# Patient Record
Sex: FEMALE | Race: WHITE | HISPANIC OR LATINO | Employment: UNEMPLOYED | URBAN - METROPOLITAN AREA
[De-identification: names, ages, dates, MRNs, and addresses within clinical notes are randomized per-mention and may not be internally consistent; named-entity substitution may affect disease eponyms.]

---

## 2017-03-07 ENCOUNTER — LAB VISIT (OUTPATIENT)
Dept: LAB | Facility: HOSPITAL | Age: 32
End: 2017-03-07
Attending: OBSTETRICS & GYNECOLOGY
Payer: MEDICAID

## 2017-03-07 DIAGNOSIS — Z34.83 PRENATAL CARE, SUBSEQUENT PREGNANCY, THIRD TRIMESTER: ICD-10-CM

## 2017-03-07 LAB
ABO + RH BLD: NORMAL
BLD GP AB SCN CELLS X3 SERPL QL: NORMAL
RH BLD: NORMAL

## 2017-03-07 PROCEDURE — 86901 BLOOD TYPING SEROLOGIC RH(D): CPT | Mod: 91

## 2017-03-07 PROCEDURE — 36415 COLL VENOUS BLD VENIPUNCTURE: CPT

## 2017-03-07 PROCEDURE — 86900 BLOOD TYPING SEROLOGIC ABO: CPT

## 2017-03-07 PROCEDURE — 86901 BLOOD TYPING SEROLOGIC RH(D): CPT

## 2017-03-08 ENCOUNTER — HOSPITAL ENCOUNTER (OUTPATIENT)
Dept: OBSTETRICS AND GYNECOLOGY | Facility: HOSPITAL | Age: 32
Discharge: HOME | End: 2017-03-08
Attending: OBSTETRICS & GYNECOLOGY
Payer: MEDICAID

## 2017-03-08 DIAGNOSIS — Z34.83 PRENATAL CARE, SUBSEQUENT PREGNANCY, THIRD TRIMESTER: ICD-10-CM

## 2017-03-08 LAB — INJECT RH IG VOL PATIENT: NORMAL ML

## 2017-03-08 PROCEDURE — 63600519 RHOGAM PHARM REV CODE 636 ALT 250 W HCPCS: Performed by: OBSTETRICS & GYNECOLOGY

## 2017-03-08 PROCEDURE — 96372 THER/PROPH/DIAG INJ SC/IM: CPT

## 2017-03-08 RX ADMIN — HUMAN RHO(D) IMMUNE GLOBULIN 300 MCG: 300 INJECTION, SOLUTION INTRAMUSCULAR at 05:03

## 2017-05-30 PROBLEM — Z34.80 PRENATAL CARE, SUBSEQUENT PREGNANCY: Status: ACTIVE | Noted: 2017-05-30

## 2017-06-08 ENCOUNTER — TELEPHONE (OUTPATIENT)
Dept: OBSTETRICS AND GYNECOLOGY | Facility: HOSPITAL | Age: 32
End: 2017-06-08

## 2017-06-08 NOTE — TELEPHONE ENCOUNTER
Spoke to pt who states baby continues breastfeeding well and having many wet and dirty diapers -about 8 a day -reassured normal -denies any problems with breasts and has no questions or concerns at this time

## 2024-09-04 ENCOUNTER — HOSPITAL ENCOUNTER (EMERGENCY)
Facility: HOSPITAL | Age: 39
Discharge: HOME | End: 2024-09-04
Attending: FAMILY MEDICINE
Payer: COMMERCIAL

## 2024-09-04 VITALS
SYSTOLIC BLOOD PRESSURE: 127 MMHG | DIASTOLIC BLOOD PRESSURE: 89 MMHG | TEMPERATURE: 98 F | OXYGEN SATURATION: 97 % | RESPIRATION RATE: 20 BRPM | BODY MASS INDEX: 45.62 KG/M2 | WEIGHT: 267.19 LBS | HEART RATE: 67 BPM | HEIGHT: 64 IN

## 2024-09-04 DIAGNOSIS — H66.90 OTITIS MEDIA, UNSPECIFIED LATERALITY, UNSPECIFIED OTITIS MEDIA TYPE: Primary | ICD-10-CM

## 2024-09-04 PROCEDURE — 99284 EMERGENCY DEPT VISIT MOD MDM: CPT

## 2024-09-04 RX ORDER — IBUPROFEN 800 MG/1
800 TABLET ORAL 3 TIMES DAILY PRN
Qty: 30 TABLET | Refills: 0 | Status: SHIPPED | OUTPATIENT
Start: 2024-09-04

## 2024-09-04 RX ORDER — AMOXICILLIN AND CLAVULANATE POTASSIUM 875; 125 MG/1; MG/1
1 TABLET, FILM COATED ORAL EVERY 12 HOURS
Qty: 14 TABLET | Refills: 0 | Status: SHIPPED | OUTPATIENT
Start: 2024-09-04 | End: 2024-09-11

## 2024-09-04 RX ORDER — NEOMYCIN SULFATE, POLYMYXIN B SULFATE AND HYDROCORTISONE 10; 3.5; 1 MG/ML; MG/ML; [USP'U]/ML
3 SUSPENSION/ DROPS AURICULAR (OTIC) 3 TIMES DAILY
Qty: 10 ML | Refills: 0 | Status: SHIPPED | OUTPATIENT
Start: 2024-09-04

## 2024-09-04 NOTE — ED PROVIDER NOTES
Encounter Date: 9/4/2024       History     Chief Complaint   Patient presents with    Otalgia     Pt reports ear pain x6 months, reports being put on antibiotics with no relief.       Patient states she was seen about a month ago and diagnosed with an ear infection.  Took the course of antibiotics and 2 days ago started having left ear pain again.    The history is provided by the patient.   Otalgia  This is a recurrent problem. The current episode started two days ago. There is pain in the left ear.     Review of patient's allergies indicates:  No Known Allergies  Past Medical History:   Diagnosis Date    Anxiety      Past Surgical History:   Procedure Laterality Date    HERNIA REPAIR      UMBILICAL HERNIA REPAIR       Family History   Problem Relation Name Age of Onset    Hypertension Mother      Diabetes Sister      Cancer Neg Hx      Breast cancer Neg Hx      Colon cancer Neg Hx      Ovarian cancer Neg Hx       Social History     Tobacco Use    Smoking status: Never   Substance Use Topics    Alcohol use: No    Drug use: No     Review of Systems   HENT:  Positive for ear pain.    All other systems reviewed and are negative.      Physical Exam     Initial Vitals [09/04/24 0934]   BP Pulse Resp Temp SpO2   (!) 139/93 72 16 98 °F (36.7 °C) 97 %      MAP       --         Physical Exam    Nursing note and vitals reviewed.  Constitutional: She appears well-developed and well-nourished. She is not diaphoretic. No distress.   HENT:   Head: Normocephalic and atraumatic.   Left Ear: There is swelling and tenderness. Tympanic membrane is injected.   Nose: Nose normal.   Mouth/Throat: Oropharynx is clear and moist.   Eyes: Conjunctivae and EOM are normal. Pupils are equal, round, and reactive to light.   Neck: Neck supple. No tracheal deviation present.   Normal range of motion.  Cardiovascular:  Normal rate, intact distal pulses and normal pulses.     Exam reveals no decreased pulses.       Pulmonary/Chest: Effort normal.  No respiratory distress.   Abdominal: Abdomen is soft. She exhibits no distension. There is no abdominal tenderness.   Musculoskeletal:         General: No tenderness. Normal range of motion.      Cervical back: Normal range of motion and neck supple.      Comments: No acute change     Neurological: She is alert and oriented to person, place, and time. GCS score is 15. GCS eye subscore is 4. GCS verbal subscore is 5. GCS motor subscore is 6.   No acute change   Skin: Skin is warm and dry. No rash noted.   Psychiatric: She has a normal mood and affect. Thought content normal.         ED Course   Procedures  Labs Reviewed - No data to display       Imaging Results    None          Medications - No data to display  Medical Decision Making    Additional MDM:   Differential Diagnosis:   Otitis externa, otitis media, otalgia, impaction, barotrauma                                    Clinical Impression:  Final diagnoses:  [H66.90] Otitis media, unspecified laterality, unspecified otitis media type (Primary)          ED Disposition Condition    Discharge Stable          ED Prescriptions       Medication Sig Dispense Start Date End Date Auth. Provider    amoxicillin-clavulanate 875-125mg (AUGMENTIN) 875-125 mg per tablet Take 1 tablet by mouth every 12 (twelve) hours. for 7 days 14 tablet 9/4/2024 9/11/2024 Henok Salcedo MD    ibuprofen (ADVIL,MOTRIN) 800 MG tablet Take 1 tablet (800 mg total) by mouth 3 (three) times daily as needed for Pain. 30 tablet 9/4/2024 -- Henok Salcedo MD    neomycin-polymyxin-hydrocortisone (CORTISPORIN) 3.5-10,000-1 mg/mL-unit/mL-% otic suspension Place 3 drops into the left ear 3 (three) times daily. 10 mL 9/4/2024 -- Henok Salcedo MD          Follow-up Information       Follow up With Specialties Details Why Contact Info    [facility], Faby GUERRERO [facility]    [address] [facility] [address] [phone]      [facility] - Otolaryngology Otolaryngology Schedule  an appointment as soon as possible for a visit   [address]  [facility] [address]             Henok Salcedo MD  09/04/24 0959

## 2024-09-13 ENCOUNTER — HOSPITAL ENCOUNTER (EMERGENCY)
Facility: HOSPITAL | Age: 39
Discharge: HOME | End: 2024-09-14
Payer: COMMERCIAL

## 2024-09-13 DIAGNOSIS — N30.00 ACUTE CYSTITIS WITHOUT HEMATURIA: Primary | ICD-10-CM

## 2024-09-13 DIAGNOSIS — D27.1 TERATOMA OF OVARY, LEFT: ICD-10-CM

## 2024-09-13 DIAGNOSIS — D27.0 TERATOMA OF RIGHT OVARY: ICD-10-CM

## 2024-09-13 DIAGNOSIS — R10.2 PELVIC PAIN: ICD-10-CM

## 2024-09-13 LAB
B-HCG UR QL: NEGATIVE
BACTERIA #/AREA URNS AUTO: ABNORMAL /HPF
BILIRUB UR QL STRIP.AUTO: NEGATIVE
CLARITY UR: ABNORMAL
COLOR UR AUTO: ABNORMAL
GLUCOSE UR QL STRIP: NEGATIVE
HGB UR QL STRIP: NEGATIVE
KETONES UR QL STRIP: ABNORMAL
LEUKOCYTE ESTERASE UR QL STRIP: NEGATIVE
MUCOUS THREADS URNS QL MICRO: ABNORMAL /LPF
NITRITE UR QL STRIP: POSITIVE
PH UR STRIP: 6 [PH]
PROT UR QL STRIP: NEGATIVE
RBC #/AREA URNS AUTO: ABNORMAL /HPF
SP GR UR STRIP.AUTO: >=1.03 (ref 1–1.03)
SQUAMOUS #/AREA URNS AUTO: ABNORMAL /HPF
UROBILINOGEN UR STRIP-ACNC: 0.2
WBC #/AREA URNS AUTO: ABNORMAL /HPF

## 2024-09-13 PROCEDURE — 81003 URINALYSIS AUTO W/O SCOPE: CPT

## 2024-09-13 PROCEDURE — 87077 CULTURE AEROBIC IDENTIFY: CPT

## 2024-09-13 PROCEDURE — 81015 MICROSCOPIC EXAM OF URINE: CPT

## 2024-09-13 PROCEDURE — 99285 EMERGENCY DEPT VISIT HI MDM: CPT | Mod: 25

## 2024-09-13 PROCEDURE — 81025 URINE PREGNANCY TEST: CPT

## 2024-09-13 PROCEDURE — 87086 URINE CULTURE/COLONY COUNT: CPT

## 2024-09-13 RX ORDER — KETOROLAC TROMETHAMINE 30 MG/ML
30 INJECTION, SOLUTION INTRAMUSCULAR; INTRAVENOUS
Status: COMPLETED | OUTPATIENT
Start: 2024-09-14 | End: 2024-09-14

## 2024-09-13 NOTE — Clinical Note
"Niki Mcbride" Nathan Thakur was seen and treated in our emergency department on 9/13/2024.  She may return to work on 09/23/2024.       If you have any questions or concerns, please don't hesitate to call.      Jose R Brown MD"

## 2024-09-14 VITALS
WEIGHT: 268 LBS | SYSTOLIC BLOOD PRESSURE: 164 MMHG | BODY MASS INDEX: 46 KG/M2 | HEART RATE: 79 BPM | TEMPERATURE: 98 F | DIASTOLIC BLOOD PRESSURE: 104 MMHG | RESPIRATION RATE: 18 BRPM | OXYGEN SATURATION: 97 %

## 2024-09-14 PROBLEM — R10.2 PELVIC PAIN: Status: ACTIVE | Noted: 2024-09-14

## 2024-09-14 PROBLEM — N30.00 ACUTE CYSTITIS WITHOUT HEMATURIA: Status: ACTIVE | Noted: 2024-09-14

## 2024-09-14 PROCEDURE — 63600175 PHARM REV CODE 636 W HCPCS

## 2024-09-14 PROCEDURE — 25000003 PHARM REV CODE 250

## 2024-09-14 PROCEDURE — 96374 THER/PROPH/DIAG INJ IV PUSH: CPT

## 2024-09-14 PROCEDURE — 96375 TX/PRO/DX INJ NEW DRUG ADDON: CPT

## 2024-09-14 RX ORDER — NITROFURANTOIN 25; 75 MG/1; MG/1
100 CAPSULE ORAL 2 TIMES DAILY
Qty: 10 CAPSULE | Refills: 0 | Status: SHIPPED | OUTPATIENT
Start: 2024-09-14 | End: 2024-09-19

## 2024-09-14 RX ORDER — NAPROXEN 500 MG/1
500 TABLET ORAL 2 TIMES DAILY
Qty: 20 TABLET | Refills: 0 | Status: SHIPPED | OUTPATIENT
Start: 2024-09-14

## 2024-09-14 RX ADMIN — KETOROLAC TROMETHAMINE 30 MG: 30 INJECTION, SOLUTION INTRAMUSCULAR at 12:09

## 2024-09-14 RX ADMIN — CEFTRIAXONE SODIUM 1 G: 1 INJECTION, POWDER, FOR SOLUTION INTRAMUSCULAR; INTRAVENOUS at 12:09

## 2024-09-14 NOTE — ED PROVIDER NOTES
Encounter Date: 9/13/2024       History     Chief Complaint   Patient presents with    Abdominal Pain     LOWER ABDOMINAL PAIN AND DYSURIA X2 DAYS     39-year-old female presents complaining of pelvic pain and dysuria for 2 days.  She denies any fever or chills.  She does not think she is pregnant.  Denies any constipation or diarrhea.    The history is provided by the patient.     Review of patient's allergies indicates:  No Known Allergies  Past Medical History:   Diagnosis Date    Anxiety      Past Surgical History:   Procedure Laterality Date    HERNIA REPAIR      UMBILICAL HERNIA REPAIR       Family History   Problem Relation Name Age of Onset    Hypertension Mother      Diabetes Sister      Cancer Neg Hx      Breast cancer Neg Hx      Colon cancer Neg Hx      Ovarian cancer Neg Hx       Social History     Tobacco Use    Smoking status: Never   Substance Use Topics    Alcohol use: No    Drug use: No     Review of Systems   Constitutional:  Negative for fever.   HENT:  Negative for sore throat.    Respiratory:  Negative for shortness of breath.    Cardiovascular:  Negative for chest pain.   Gastrointestinal:  Negative for nausea.   Genitourinary:  Positive for dysuria and pelvic pain.   Musculoskeletal:  Negative for back pain.   Skin:  Negative for rash.   Neurological:  Negative for weakness.   Hematological:  Does not bruise/bleed easily.   All other systems reviewed and are negative.      Physical Exam     Initial Vitals [09/13/24 2316]   BP Pulse Resp Temp SpO2   (!) 164/104 85 18 98 °F (36.7 °C) 98 %      MAP       --         Physical Exam    Nursing note and vitals reviewed.  Constitutional: Vital signs are normal. She appears well-developed and well-nourished. She is cooperative.   HENT:   Head: Normocephalic and atraumatic.   Mouth/Throat: Oropharynx is clear and moist.   Eyes: Conjunctivae, EOM and lids are normal. Pupils are equal, round, and reactive to light.   Neck: Trachea normal. Neck supple.    Normal range of motion.  Cardiovascular:  Normal rate, regular rhythm, normal heart sounds and intact distal pulses.           Pulmonary/Chest: Breath sounds normal.   Abdominal: Abdomen is soft. Bowel sounds are normal. She exhibits no distension. There is no abdominal tenderness. There is no rebound and no guarding.   Musculoskeletal:         General: Normal range of motion.      Cervical back: Normal, normal range of motion and neck supple.      Lumbar back: Normal.     Neurological: She is alert and oriented to person, place, and time. She has normal strength. Coordination normal. GCS score is 15. GCS eye subscore is 4. GCS verbal subscore is 5. GCS motor subscore is 6.   Skin: Skin is warm, dry and intact. Capillary refill takes less than 2 seconds.   Psychiatric: She has a normal mood and affect. Her speech is normal and behavior is normal. Judgment and thought content normal. Cognition and memory are normal.         ED Course   Procedures  Labs Reviewed   URINALYSIS, REFLEX TO URINE CULTURE - Abnormal       Result Value    Color, UA Orange (*)     Appearance, UA Turbid (*)     Specific Gravity, UA >=1.030      pH, UA 6.0      Protein, UA Negative      Glucose, UA Negative      Ketones, UA Trace (*)     Blood, UA Negative      Bilirubin, UA Negative      Urobilinogen, UA 0.2      Nitrites, UA Positive (*)     Leukocyte Esterase, UA Negative      Narrative:      URINE STABILITY IS 2 HOURS AT ROOM TEMP OR    SIX HOURS REFRIGERATED. PERFORMING TESTING ON    SPECIMENS GREATER THAN THIS AGE MAY AFFECT THE    FOLLOWING TESTS:    PH          SPECIFIC GRAVITY           BLOOD    CLARITY     BILIRUBIN               UROBILINOGEN   URINALYSIS, MICROSCOPIC - Abnormal    Bacteria, UA Moderate (*)     Mucous, UA Small (*)     RBC, UA 3-5      WBC, UA 6-10 (*)     Squamous Epithelial Cells, UA Many (*)    HCG QUALITATIVE URINE - Normal    hCG Qualitative, Urine Negative     CULTURE, URINE          Imaging Results               CT Abdomen Pelvis  Without Contrast (Preliminary result)  Result time 09/14/24 00:34:34      Preliminary result by Kem Epps MD (09/14/24 00:34:34)                   Narrative:    START OF REPORT:  Technique: CT of the abdomen and pelvis was performed with axial images as well as sagittal and coronal reconstruction images without intravenous contrast.    Comparison: None available.    Clinical History: LOW ABD PN.    Dosage Information: Automated Exposure Control was utilized.    Findings:  Lines and Tubes: None.  Thorax:  Lungs: The visualized lung bases appear unremarkable. No focal infiltrate or consolidation is seen.  Pleura: No effusions or thickening. No pneumothorax is seen.  Heart: The heart size is within normal limits.  Abdomen:  Abdominal Wall: No abdominal wall pathology is seen.  Liver: Moderate fatty infiltration of the liver is present. The liver otherwise appears unremarkable.  Biliary System: No intrahepatic or extrahepatic biliary duct dilatation is seen.  Gallbladder: The gallbladder appears unremarkable.  Pancreas: The pancreas appears unremarkable.  Spleen: The spleen appears unremarkable.  Adrenals: The adrenal glands appear unremarkable.  Kidneys: The left kidney appears unremarkable with no stones cysts masses or hydronephrosis. There is a 3 cm cyst in the interpolar region of the right kidney. The right kidney otherwise appears unremarkable with no stones masses or hydronephrosis.  Aorta: The abdominal aorta appears unremarkable.  IVC: Unremarkable.  Bowel:  Esophagus: The visualized esophagus appears unremarkable.  Stomach: The stomach appears unremarkable.  Duodenum: Unremarkable appearing duodenum.  Small Bowel: The small bowel appears unremarkable.  Colon: A few diverticula are seen in the sigmoid colon.  Appendix: The appendix appears unremarkable and is seen on series 3 image 59 to 63.  Peritoneum: No intraperitoneal free air or ascites is seen.    Pelvis:  Bladder: The  bladder is nondistended and cannot be definitively evaluated.  Female:  Uterus: The uterus appears unremarkable.  Ovaries: There is a large lobulated mixed attenuating mass with fat, soft tissue and punctate calcified components in the right adnexa measuring 8.5 x 10.4 x 4.6 cm (series 3 image 68). There is a similar smaller lesion in the left adnexa measuring 3.4 x 3.6 x 2.6 cm (series 3 image 71). These are consistent with mature ovarian teratomas.    Bony structures:  Dorsal Spine: There is subtle multilevel spondylosis of the visualized dorsal spine.  Bony Pelvis: The visualized bony structures of the pelvis appear unremarkable.      Impression:  1. There is a large lobulated mixed attenuating mass with fat, soft tissue and punctate calcified components in the right adnexa measuring 8.5 x 10.4 x 4.6 cm (series 3 image 68). There is a similar smaller lesion in the left adnexa measuring 3.4 x 3.6 x 2.6 cm (series 3 image 71). These are consistent with mature ovarian teratomas. Correlate with clinical findings as regards additional evaluation and follow-up.  2. No acute intraabdominal or pelvic solid organ or bowel pathology identified. Details and other findings as discussed above.                                         Medications   sodium chloride 0.9% bolus 1,000 mL 1,000 mL (has no administration in time range)   cefTRIAXone (Rocephin) 1 g in D5W 100 mL IVPB (MB+) (1 g Intravenous Incomplete 9/14/24 0035)   ketorolac injection 30 mg (30 mg Intravenous Incomplete 9/14/24 0033)     Medical Decision Making  Dysuria, pelvic pain x2 days  Differential diagnosis:  Appendicitis, diverticulitis, UTI, ureterolithiasis, ovarian cyst, constipation, enteritis, AAA  UA, UPT, CT    Amount and/or Complexity of Data Reviewed  Labs:  Decision-making details documented in ED Course.  Radiology: ordered.    Risk  Prescription drug management.               ED Course as of 09/14/24 0041   Fri Sep 13, 2024   2347 Urinalysis,  Microscopic(!)  Consistent with a UTI [TM]   Sat Sep 14, 2024   0037 CT Abdomen Pelvis  Without Contrast  Bilateral teratomas noted.  These were seen on previous testing. [TM]      ED Course User Index  [TM] Jose R Brown MD                             Clinical Impression:  Final diagnoses:  [N30.00] Acute cystitis without hematuria (Primary)  [R10.2] Pelvic pain  [D27.1] Teratoma of ovary, left  [D27.0] Teratoma of right ovary          ED Disposition Condition    Discharge Good          ED Prescriptions       Medication Sig Dispense Start Date End Date Auth. Provider    nitrofurantoin, macrocrystal-monohydrate, (MACROBID) 100 MG capsule Take 1 capsule (100 mg total) by mouth 2 (two) times daily. for 5 days 10 capsule 9/14/2024 9/19/2024 Jose R Brown MD    naproxen (NAPROSYN) 500 MG tablet Take 1 tablet (500 mg total) by mouth 2 (two) times daily. 20 tablet 9/14/2024 -- Jose R Brown MD          Follow-up Information       Follow up With Specialties Details Why Contact Info    [place], [facility]  Call in 2 days  [address] [facility] [address] [phone]               Jose R Brown MD  09/14/24 0041

## 2024-09-16 LAB — BACTERIA UR CULT: ABNORMAL

## 2024-12-16 ENCOUNTER — HOSPITAL ENCOUNTER (EMERGENCY)
Facility: HOSPITAL | Age: 39
Discharge: HOME | End: 2024-12-16
Attending: SURGERY
Payer: COMMERCIAL

## 2024-12-16 VITALS
SYSTOLIC BLOOD PRESSURE: 142 MMHG | HEART RATE: 67 BPM | BODY MASS INDEX: 41.99 KG/M2 | DIASTOLIC BLOOD PRESSURE: 99 MMHG | HEIGHT: 65 IN | OXYGEN SATURATION: 97 % | RESPIRATION RATE: 20 BRPM | WEIGHT: 252 LBS | TEMPERATURE: 98 F

## 2024-12-16 DIAGNOSIS — I10 UNCONTROLLED HYPERTENSION: ICD-10-CM

## 2024-12-16 DIAGNOSIS — E66.9 OBESITY, UNSPECIFIED CLASS, UNSPECIFIED OBESITY TYPE, UNSPECIFIED WHETHER SERIOUS COMORBIDITY PRESENT: ICD-10-CM

## 2024-12-16 DIAGNOSIS — O20.0 THREATENED ABORTION: Primary | ICD-10-CM

## 2024-12-16 DIAGNOSIS — D50.9 HYPOCHROMIC MICROCYTIC ANEMIA: ICD-10-CM

## 2024-12-16 LAB
ALBUMIN SERPL-MCNC: 4 G/DL (ref 3.4–5)
ALBUMIN/GLOB SERPL: 1.5 RATIO
ALP SERPL-CCNC: 79 UNIT/L (ref 50–144)
ALT SERPL-CCNC: 31 UNIT/L (ref 1–45)
ANION GAP SERPL CALC-SCNC: 8 MEQ/L (ref 2–13)
AST SERPL-CCNC: 29 UNIT/L (ref 14–36)
B-HCG FREE SERPL-ACNC: 153.26 MIU/ML
BACTERIA #/AREA URNS AUTO: ABNORMAL /HPF
BASOPHILS # BLD AUTO: 0.04 X10(3)/MCL (ref 0.01–0.08)
BASOPHILS NFR BLD AUTO: 0.4 % (ref 0.1–1.2)
BILIRUB SERPL-MCNC: 0.4 MG/DL (ref 0–1)
BILIRUB UR QL STRIP.AUTO: NEGATIVE
BUN SERPL-MCNC: 13 MG/DL (ref 7–20)
CALCIUM SERPL-MCNC: 9 MG/DL (ref 8.4–10.2)
CHLORIDE SERPL-SCNC: 108 MMOL/L (ref 98–110)
CLARITY UR: CLEAR
CO2 SERPL-SCNC: 22 MMOL/L (ref 21–32)
COLOR UR AUTO: YELLOW
CREAT SERPL-MCNC: 0.79 MG/DL (ref 0.66–1.25)
CREAT/UREA NIT SERPL: 16 (ref 12–20)
EOSINOPHIL # BLD AUTO: 0.13 X10(3)/MCL (ref 0.04–0.36)
EOSINOPHIL NFR BLD AUTO: 1.2 % (ref 0.7–7)
ERYTHROCYTE [DISTWIDTH] IN BLOOD BY AUTOMATED COUNT: 15.4 % (ref 11–14.5)
GFR SERPLBLD CREATININE-BSD FMLA CKD-EPI: >90 ML/MIN/1.73/M2
GLOBULIN SER-MCNC: 2.7 GM/DL (ref 2–3.9)
GLUCOSE SERPL-MCNC: 110 MG/DL (ref 70–115)
GLUCOSE UR QL STRIP: NEGATIVE
HCT VFR BLD AUTO: 34.4 % (ref 36–48)
HGB BLD-MCNC: 11.4 G/DL (ref 11.8–16)
HGB UR QL STRIP: ABNORMAL
IMM GRANULOCYTES # BLD AUTO: 0.05 X10(3)/MCL (ref 0–0.03)
IMM GRANULOCYTES NFR BLD AUTO: 0.5 % (ref 0–0.5)
KETONES UR QL STRIP: NEGATIVE
LEUKOCYTE ESTERASE UR QL STRIP: ABNORMAL
LYMPHOCYTES # BLD AUTO: 2.78 X10(3)/MCL (ref 1.16–3.74)
LYMPHOCYTES NFR BLD AUTO: 26.7 % (ref 20–55)
MCH RBC QN AUTO: 26.1 PG (ref 27–34)
MCHC RBC AUTO-ENTMCNC: 33.1 G/DL (ref 31–37)
MCV RBC AUTO: 78.9 FL (ref 79–99)
MONOCYTES # BLD AUTO: 0.61 X10(3)/MCL (ref 0.24–0.36)
MONOCYTES NFR BLD AUTO: 5.8 % (ref 4.7–12.5)
NEUTROPHILS # BLD AUTO: 6.82 X10(3)/MCL (ref 1.56–6.13)
NEUTROPHILS NFR BLD AUTO: 65.4 % (ref 37–73)
NITRITE UR QL STRIP: NEGATIVE
NRBC BLD AUTO-RTO: 0 %
PH UR STRIP: 6.5 [PH]
PLATELET # BLD AUTO: 284 X10(3)/MCL (ref 140–371)
PMV BLD AUTO: 10.6 FL (ref 9.4–12.4)
POTASSIUM SERPL-SCNC: 3.7 MMOL/L (ref 3.5–5.1)
PROT SERPL-MCNC: 6.7 GM/DL (ref 6.3–8.2)
PROT UR QL STRIP: NEGATIVE
RBC # BLD AUTO: 4.36 X10(6)/MCL (ref 4–5.1)
RBC #/AREA URNS AUTO: ABNORMAL /HPF
SODIUM SERPL-SCNC: 138 MMOL/L (ref 136–145)
SP GR UR STRIP.AUTO: 1.01 (ref 1–1.03)
SQUAMOUS #/AREA URNS AUTO: ABNORMAL /HPF
UROBILINOGEN UR STRIP-ACNC: 0.2
WBC # BLD AUTO: 10.43 X10(3)/MCL (ref 4–11.5)
WBC #/AREA URNS AUTO: ABNORMAL /HPF

## 2024-12-16 PROCEDURE — 25000003 PHARM REV CODE 250: Performed by: SURGERY

## 2024-12-16 PROCEDURE — 85025 COMPLETE CBC W/AUTO DIFF WBC: CPT | Performed by: SURGERY

## 2024-12-16 PROCEDURE — 96374 THER/PROPH/DIAG INJ IV PUSH: CPT

## 2024-12-16 PROCEDURE — 96376 TX/PRO/DX INJ SAME DRUG ADON: CPT

## 2024-12-16 PROCEDURE — 81015 MICROSCOPIC EXAM OF URINE: CPT | Performed by: SURGERY

## 2024-12-16 PROCEDURE — 81003 URINALYSIS AUTO W/O SCOPE: CPT | Performed by: SURGERY

## 2024-12-16 PROCEDURE — 63600175 PHARM REV CODE 636 W HCPCS: Mod: JZ,JG | Performed by: SURGERY

## 2024-12-16 PROCEDURE — 99285 EMERGENCY DEPT VISIT HI MDM: CPT | Mod: 25

## 2024-12-16 PROCEDURE — 84702 CHORIONIC GONADOTROPIN TEST: CPT | Performed by: SURGERY

## 2024-12-16 PROCEDURE — 80053 COMPREHEN METABOLIC PANEL: CPT | Performed by: SURGERY

## 2024-12-16 RX ORDER — LABETALOL HCL 20 MG/4 ML
20 SYRINGE (ML) INTRAVENOUS ONCE
Status: COMPLETED | OUTPATIENT
Start: 2024-12-16 | End: 2024-12-16

## 2024-12-16 RX ORDER — LABETALOL HCL 20 MG/4 ML
10 SYRINGE (ML) INTRAVENOUS ONCE
Status: COMPLETED | OUTPATIENT
Start: 2024-12-16 | End: 2024-12-16

## 2024-12-16 RX ORDER — SODIUM CHLORIDE 9 MG/ML
INJECTION, SOLUTION INTRAVENOUS CONTINUOUS
Status: DISCONTINUED | OUTPATIENT
Start: 2024-12-16 | End: 2024-12-17 | Stop reason: HOSPADM

## 2024-12-16 RX ADMIN — LABETALOL HYDROCHLORIDE 10 MG: 5 INJECTION, SOLUTION INTRAVENOUS at 10:12

## 2024-12-16 RX ADMIN — SODIUM CHLORIDE: 9 INJECTION, SOLUTION INTRAVENOUS at 07:12

## 2024-12-16 RX ADMIN — LABETALOL HYDROCHLORIDE 20 MG: 5 INJECTION, SOLUTION INTRAVENOUS at 07:12

## 2024-12-16 NOTE — Clinical Note
"Niki "Niki" Nathan Thakur was seen and treated in our emergency department on 12/16/2024.  She may return to work on 12/20/2024.       If you have any questions or concerns, please don't hesitate to call.      Kam Boyer MD"

## 2024-12-17 NOTE — ED PROVIDER NOTES
Encounter Date: 2024       History     Chief Complaint   Patient presents with    Vaginal Bleeding     2 positive home pregnancy test. Heavy bleeding started this morning around 11. Worried for miscarriage. 2 Live births, 1 previous miscarriage. Last Period Oct 26th, 2024.     Abdominal Pain     38 YO OBESE  WF W/ ACUTE VAGINAL BLEEDING ONSET APPROX 1100 THIS AM W/ THREE PADS SINCE ONSET.  +INTERMITTENT LOWER AB/PELVIC PAIN.  NO AB TRAUMA.   .  TWO HOME PREG POSITIVE.  NO PRENATAL CARE.  +SELF D/C DAILY LISINOPRIL W/ POSITIVE HOME PREGNANCY RESULTS.  NO PASSAGE OF TISSUE.  NO OB U/S W/ THIS PREGNANCY.          Review of patient's allergies indicates:  No Known Allergies  Past Medical History:   Diagnosis Date    Anxiety     Essential (primary) hypertension      Past Surgical History:   Procedure Laterality Date    HERNIA REPAIR      UMBILICAL HERNIA REPAIR       Family History   Problem Relation Name Age of Onset    Hypertension Mother      Diabetes Sister      Cancer Neg Hx      Breast cancer Neg Hx      Colon cancer Neg Hx      Ovarian cancer Neg Hx       Social History     Tobacco Use    Smoking status: Never   Substance Use Topics    Alcohol use: No    Drug use: No     Review of Systems   All other systems reviewed and are negative.      Physical Exam     Initial Vitals [24 1902]   BP Pulse Resp Temp SpO2   (!) 158/105 88 16 97.7 °F (36.5 °C) 98 %      MAP       --         Physical Exam    Nursing note and vitals reviewed.  Constitutional: She appears well-developed and well-nourished. She is not diaphoretic. No distress.   OBESE     HENT:   Head: Normocephalic and atraumatic.   Right Ear: External ear normal.   Left Ear: External ear normal.   Nose: Nose normal. Mouth/Throat: Oropharynx is clear and moist. No oropharyngeal exudate.   Eyes: Conjunctivae and EOM are normal. Pupils are equal, round, and reactive to light. No scleral icterus.   Neck: Neck supple. No thyromegaly  present. No tracheal deviation present. No JVD present.   Normal range of motion.  Cardiovascular:  Normal rate, regular rhythm, normal heart sounds and intact distal pulses.     Exam reveals no gallop.       No murmur heard.  Pulmonary/Chest: Breath sounds normal. No stridor. No respiratory distress. She has no wheezes. She has no rhonchi. She has no rales.   Abdominal: Abdomen is soft. Bowel sounds are normal. She exhibits no distension and no mass. There is abdominal tenderness.   DIFFUSE DIRECT LOWER AB/PELVIC TENDERNESS  NO REBOUND  NO MASSES   There is no rebound and no guarding.   Musculoskeletal:         General: No tenderness or edema. Normal range of motion.      Cervical back: Normal range of motion and neck supple.     Lymphadenopathy:     She has no cervical adenopathy.   Neurological: She is alert and oriented to person, place, and time. She has normal strength. No cranial nerve deficit or sensory deficit.   Skin: Skin is warm. Capillary refill takes less than 2 seconds.   Psychiatric: She has a normal mood and affect. Her behavior is normal. Judgment and thought content normal.         ED Course   Procedures  Labs Reviewed   CBC WITH DIFFERENTIAL - Abnormal       Result Value    WBC 10.43      RBC 4.36      Hgb 11.4 (*)     Hct 34.4 (*)     MCV 78.9 (*)     MCH 26.1 (*)     MCHC 33.1      RDW 15.4 (*)     Platelet 284      MPV 10.6      Neut % 65.4      Lymph % 26.7      Mono % 5.8      Eos % 1.2      Basophil % 0.4      Lymph # 2.78      Neut # 6.82 (*)     Mono # 0.61 (*)     Eos # 0.13      Baso # 0.04      IG# 0.05 (*)     IG% 0.5      NRBC% 0.0     URINALYSIS, REFLEX TO URINE CULTURE - Abnormal    Color, UA Yellow      Appearance, UA Clear      Specific Gravity, UA 1.010      pH, UA 6.5      Protein, UA Negative      Glucose, UA Negative      Ketones, UA Negative      Blood, UA Large (*)     Bilirubin, UA Negative      Urobilinogen, UA 0.2      Nitrites, UA Negative      Leukocyte Esterase, UA  Trace (*)     Narrative:      URINE STABILITY IS 2 HOURS AT ROOM TEMP OR    SIX HOURS REFRIGERATED. PERFORMING TESTING ON    SPECIMENS GREATER THAN THIS AGE MAY AFFECT THE    FOLLOWING TESTS:    PH          SPECIFIC GRAVITY           BLOOD    CLARITY     BILIRUBIN               UROBILINOGEN   URINALYSIS, MICROSCOPIC - Abnormal    Bacteria, UA Rare      RBC, UA 50-99 (*)     WBC, UA 0-5      Squamous Epithelial Cells, UA Rare     CBC W/ AUTO DIFFERENTIAL    Narrative:     The following orders were created for panel order CBC Auto Differential.  Procedure                               Abnormality         Status                     ---------                               -----------         ------                     CBC with Differential[[id number]]        Abnormal            Final result                 Please view results for these tests on the individual orders.   COMPREHENSIVE METABOLIC PANEL    Sodium 138      Potassium 3.7      Chloride 108      CO2 22      Glucose 110      Blood Urea Nitrogen 13      Creatinine 0.79      Calcium 9.0      Protein Total 6.7      Albumin 4.0      Globulin 2.7      Albumin/Globulin Ratio 1.5      Bilirubin Total 0.4      ALP 79      ALT 31      AST 29      eGFR >90      Anion Gap 8.0      BUN/Creatinine Ratio 16     HCG, QUANTITATIVE    Beta HCG Quant 153.26      Narrative:     Beta-HCG ref range weeks after implantation (mIU/mL):   3-4wks 9-130   4-5wks    5-6wks 850-29721   6-7wks [id number]   7-12wks [id number]   12-16wks [id number]   16-28wks 1400-14328   20-41wks [phone]          Imaging Results              US OB Transvaginal (Preliminary result)  Result time 12/16/24 22:26:30      Preliminary result by Kem Epps MD (12/16/24 22:26:30)                   Narrative:    START OF REPORT:  Technique: First trimester ultrasound of the pelvis was performed with transvaginal images being obtained.    Comparison: None.    Clinical history: VAG BLEEDING WITH  CRAMPING TODAY.    FINDINGS:  Uterus: The uterus measures 9.88 cm in sagittal dimension by 5.26 cm in transverse dimension by 5.53 cm in AP dimension. No intrauterine gestation is identified with no definitive intrauterine gestational sac, yolk sac, fetal pole, or heart rate identified on the submitted images.    Cervix: The cervical length is 3.26 cm.    Adnexa:  Right Ovary: The right ovary measures 2.45 cm by 2.18 cm by 1.74 cm.  Left Ovary: The left ovary measures 1.87 cm by 1.20 cm by 1.66 cm. There is a large complex heterogenous mass adjacent to the left ovary with calcific components and no associated tenderness within the region (Series 1 Image 25). No significant vascularity seen on color Doppler interrogation. It measures 2.55 x 2.29 x 2.24 cm. This may be reflective of an ovarian teratoma with the possibility of an early ectopic pregnancy not excluded.    Fluid: No free fluid is seen in the pelvis.      Impression:  1. No intrauterine gestation is identified with no definitive intrauterine gestational sac, yolk sac, fetal pole, or heart rate identified on the submitted images.  2. There is a large complex heterogenous mass adjacent to the left ovary with calcific components and no associated tenderness within the region (Series 1 Image 25). No significant vascularity seen on color Doppler interrogation. It measures 2.55 x 2.29 x 2.24 cm. This may be reflective of an ovarian teratoma with the possibility of an early ectopic pregnancy not excluded. Correlate with clinical and laboratory findings and recommend additional evaluation and follow-up as indicated.  3. Recommend serial interval clinical laboratory and ultrasound follow up.                          Preliminary result by Interface, Rad Results In (12/16/24 22:26:30)                   Narrative:    START OF REPORT:  Technique: First trimester ultrasound of the pelvis was performed with transvaginal images being obtained.    Comparison:  None.    Clinical history: VAG BLEEDING WITH CRAMPING TODAY.    FINDINGS:  Uterus: The uterus measures 9.88 cm in sagittal dimension by 5.26 cm in transverse dimension by 5.53 cm in AP dimension. No intrauterine gestation is identified with no definitive intrauterine gestational sac, yolk sac, fetal pole, or heart rate identified on the submitted images.    Cervix: The cervical length is 3.26 cm.    Adnexa:  Right Ovary: The right ovary measures 2.45 cm by 2.18 cm by 1.74 cm.  Left Ovary: The left ovary measures 1.87 cm by 1.20 cm by 1.66 cm. There is a large complex heterogenous mass adjacent to the left ovary with calcific components and no associated tenderness within the region (Series 1 Image 25). No significant vascularity seen on color Doppler interrogation. It measures 2.55 x 2.29 x 2.24 cm. This may be reflective of an ovarian teratoma with the possibility of an early ectopic pregnancy not excluded.    Fluid: No free fluid is seen in the pelvis.      Impression:  1. No intrauterine gestation is identified with no definitive intrauterine gestational sac, yolk sac, fetal pole, or heart rate identified on the submitted images.  2. There is a large complex heterogenous mass adjacent to the left ovary with calcific components and no associated tenderness within the region (Series 1 Image 25). No significant vascularity seen on color Doppler interrogation. It measures 2.55 x 2.29 x 2.24 cm. This may be reflective of an ovarian teratoma with the possibility of an early ectopic pregnancy not excluded. Correlate with clinical and laboratory findings and recommend additional evaluation and follow-up as indicated.  3. Recommend serial interval clinical laboratory and ultrasound follow up.                                         Medications   0.9% NaCl infusion ( Intravenous New Bag 12/16/24 1955)   labetalol 20 mg/4 mL (5 mg/mL) IV syring (20 mg Intravenous Given 12/16/24 1955)     Medical Decision Making              ED Course as of 12/16/24 2245   Mon Dec 16, 2024   2030 Hemoglobin(!): 11.4 [WV]    Hematocrit(!): 34.4 [WV]    MCV(!): 78.9 [WV]    MCH(!): 26.1 [WV]    Beta HCG Quant: 153.26 [WV]    Impression:  1. No intrauterine gestation is identified with no definitive intrauterine gestational sac, yolk sac, fetal pole, or heart rate identified on the submitted images.  2. There is a large complex heterogenous mass adjacent to the left ovary with calcific components and no associated tenderness within the region (Series 1 Image 25). No significant vascularity seen on color Doppler interrogation. It measures 2.55 x 2.29 x 2.24 cm. This may be reflective of an ovarian teratoma with the possibility of an early ectopic pregnancy not excluded. Correlate with clinical and laboratory findings and recommend additional evaluation and follow-up as indicated.  3. Recommend serial interval clinical laboratory and ultrasound follow up.         [WV]    LIKELIHOOD OF ECTOPIC REMOTE W/ HCG LEVEL 153 AS DISCUSSED & AGREED UPON BY INTERPRETING RADIOLOGIST   [WV]      ED Course User Index  [WV] Kam Boyer MD                           Clinical Impression:  Final diagnoses:  [O20.0] Threatened  (Primary)  [E66.9] Obesity, unspecified class, unspecified obesity type, unspecified whether serious comorbidity present  [I10] Uncontrolled hypertension  [D50.9] Hypochromic microcytic anemia          ED Disposition Condition    Discharge Stable          ED Prescriptions    None       Follow-up Information    None          Kam Boyer MD  24

## 2024-12-17 NOTE — DISCHARGE INSTRUCTIONS
RETURN TO ER IMMEDIATELY IF SYMPTOMS INCREASE OR IF NEW SYMPTOMS DEVELOP.  FOLLOW UP WITH PERSONAL PHYSICIAN LATER TODAY.